# Patient Record
Sex: FEMALE | Race: WHITE | NOT HISPANIC OR LATINO
[De-identification: names, ages, dates, MRNs, and addresses within clinical notes are randomized per-mention and may not be internally consistent; named-entity substitution may affect disease eponyms.]

---

## 2021-10-11 ENCOUNTER — RESULT REVIEW (OUTPATIENT)
Age: 76
End: 2021-10-11

## 2021-10-11 ENCOUNTER — OUTPATIENT (OUTPATIENT)
Dept: OUTPATIENT SERVICES | Facility: HOSPITAL | Age: 76
LOS: 1 days | Discharge: HOME | End: 2021-10-11

## 2021-10-11 DIAGNOSIS — D64.9 ANEMIA, UNSPECIFIED: ICD-10-CM

## 2021-10-12 ENCOUNTER — RESULT REVIEW (OUTPATIENT)
Age: 76
End: 2021-10-12

## 2021-10-12 ENCOUNTER — APPOINTMENT (OUTPATIENT)
Dept: HEMATOLOGY ONCOLOGY | Facility: CLINIC | Age: 76
End: 2021-10-12
Payer: MEDICARE

## 2021-10-12 ENCOUNTER — LABORATORY RESULT (OUTPATIENT)
Age: 76
End: 2021-10-12

## 2021-10-12 VITALS
HEIGHT: 58.66 IN | DIASTOLIC BLOOD PRESSURE: 89 MMHG | RESPIRATION RATE: 12 BRPM | TEMPERATURE: 97.9 F | SYSTOLIC BLOOD PRESSURE: 158 MMHG | OXYGEN SATURATION: 99 % | HEART RATE: 92 BPM | WEIGHT: 149.69 LBS | BODY MASS INDEX: 30.58 KG/M2

## 2021-10-12 DIAGNOSIS — I10 ESSENTIAL (PRIMARY) HYPERTENSION: ICD-10-CM

## 2021-10-12 DIAGNOSIS — K21.9 GASTRO-ESOPHAGEAL REFLUX DISEASE W/OUT ESOPHAGITIS: ICD-10-CM

## 2021-10-12 DIAGNOSIS — Z78.9 OTHER SPECIFIED HEALTH STATUS: ICD-10-CM

## 2021-10-12 DIAGNOSIS — Z80.3 FAMILY HISTORY OF MALIGNANT NEOPLASM OF BREAST: ICD-10-CM

## 2021-10-12 DIAGNOSIS — K58.9 IRRITABLE BOWEL SYNDROME W/OUT DIARRHEA: ICD-10-CM

## 2021-10-12 LAB
BASOPHILS # BLD AUTO: 0 K/UL — SIGNIFICANT CHANGE UP (ref 0–0.2)
BASOPHILS NFR BLD AUTO: 0 % — SIGNIFICANT CHANGE UP (ref 0–2)
EOSINOPHIL # BLD AUTO: 0.04 K/UL — SIGNIFICANT CHANGE UP (ref 0–0.5)
EOSINOPHIL NFR BLD AUTO: 1 % — SIGNIFICANT CHANGE UP (ref 0–6)
HCT VFR BLD CALC: 39.7 % — SIGNIFICANT CHANGE UP (ref 34.5–45)
HGB BLD-MCNC: 13.1 G/DL — SIGNIFICANT CHANGE UP (ref 11.5–15.5)
LYMPHOCYTES # BLD AUTO: 3.21 K/UL — SIGNIFICANT CHANGE UP (ref 1–3.3)
LYMPHOCYTES # BLD AUTO: 77 % — HIGH (ref 13–44)
MCHC RBC-ENTMCNC: 31.1 PG — SIGNIFICANT CHANGE UP (ref 27–34)
MCHC RBC-ENTMCNC: 33 G/DL — SIGNIFICANT CHANGE UP (ref 32–36)
MCV RBC AUTO: 94.3 FL — SIGNIFICANT CHANGE UP (ref 80–100)
MONOCYTES # BLD AUTO: 0.29 K/UL — SIGNIFICANT CHANGE UP (ref 0–0.9)
MONOCYTES NFR BLD AUTO: 7 % — SIGNIFICANT CHANGE UP (ref 2–14)
NEUTROPHILS # BLD AUTO: 0.63 K/UL — LOW (ref 1.8–7.4)
NEUTROPHILS NFR BLD AUTO: 15 % — LOW (ref 43–77)
NRBC # BLD: 0 /100 — SIGNIFICANT CHANGE UP (ref 0–0)
NRBC # BLD: SIGNIFICANT CHANGE UP /100 WBCS (ref 0–0)
PLAT MORPH BLD: NORMAL — SIGNIFICANT CHANGE UP
PLATELET # BLD AUTO: 331 K/UL — SIGNIFICANT CHANGE UP (ref 150–400)
RBC # BLD: 4.21 M/UL — SIGNIFICANT CHANGE UP (ref 3.8–5.2)
RBC # FLD: 14.6 % — HIGH (ref 10.3–14.5)
RBC BLD AUTO: SIGNIFICANT CHANGE UP
WBC # BLD: 4.17 K/UL — SIGNIFICANT CHANGE UP (ref 3.8–10.5)
WBC # FLD AUTO: 4.17 K/UL — SIGNIFICANT CHANGE UP (ref 3.8–10.5)

## 2021-10-12 PROCEDURE — 99205 OFFICE O/P NEW HI 60 MIN: CPT

## 2021-10-13 LAB — HEMATOPATHOLOGY REPORT: SIGNIFICANT CHANGE UP

## 2021-10-15 LAB
ALBUMIN SERPL ELPH-MCNC: 4.4 G/DL
ALP BLD-CCNC: 91 U/L
ALT SERPL-CCNC: 16 U/L
ANION GAP SERPL CALC-SCNC: 14 MMOL/L
AST SERPL-CCNC: 15 U/L
BILIRUB SERPL-MCNC: 0.2 MG/DL
BUN SERPL-MCNC: 9 MG/DL
CALCIUM SERPL-MCNC: 10.1 MG/DL
CHLORIDE SERPL-SCNC: 103 MMOL/L
CMV DNA SPEC QL NAA+PROBE: NOT DETECTED IU/ML
CO2 SERPL-SCNC: 25 MMOL/L
CREAT SERPL-MCNC: 0.61 MG/DL
DSDNA AB SER-ACNC: <12 IU/ML
EBV DNA SERPL NAA+PROBE-ACNC: NOT DETECTED IU/ML
EBV EA AB SER IA-ACNC: >150 U/ML
EBV EA AB TITR SER IF: POSITIVE
EBV EA IGG SER QL IA: 56.2 U/ML
EBV EA IGG SER-ACNC: POSITIVE
EBV EA IGM SER IA-ACNC: POSITIVE
EBV PATRN SPEC IB-IMP: NORMAL
EBV VCA IGG SER IA-ACNC: >750 U/ML
EBV VCA IGM SER QL IA: 67.6 U/ML
EPSTEIN-BARR VIRUS CAPSID ANTIGEN IGG: POSITIVE
GLUCOSE SERPL-MCNC: 116 MG/DL
HBV CORE IGG+IGM SER QL: NONREACTIVE
HBV SURFACE AB SER QL: NONREACTIVE
HBV SURFACE AG SER QL: NONREACTIVE
HCV AB SER QL: NONREACTIVE
HCV S/CO RATIO: 0.76 S/CO
HIV1+2 AB SPEC QL IA.RAPID: NONREACTIVE
LDH SERPL-CCNC: 192 U/L
POTASSIUM SERPL-SCNC: 4.8 MMOL/L
PROT SERPL-MCNC: 7.5 G/DL
RHEUMATOID FACT SER QL: 354 IU/ML
SODIUM SERPL-SCNC: 142 MMOL/L

## 2021-10-16 PROBLEM — K58.9 IRRITABLE BOWEL SYNDROME, UNSPECIFIED TYPE: Status: ACTIVE | Noted: 2021-10-16

## 2021-10-16 PROBLEM — K21.9 CHRONIC GERD: Status: ACTIVE | Noted: 2021-10-16

## 2021-10-16 RX ORDER — MULTIVIT-MIN/FA/LYCOPEN/LUTEIN .4-300-25
TABLET ORAL DAILY
Refills: 0 | Status: ACTIVE | COMMUNITY
Start: 2021-10-16

## 2021-10-16 RX ORDER — PSYLLIUM HUSK 0.4 G
25 MCG CAPSULE ORAL DAILY
Refills: 0 | Status: ACTIVE | COMMUNITY
Start: 2021-10-16

## 2021-10-16 RX ORDER — SERTRALINE 25 MG/1
25 TABLET, FILM COATED ORAL
Refills: 0 | Status: ACTIVE | COMMUNITY
Start: 2021-10-16

## 2021-10-16 RX ORDER — LEVOTHYROXINE SODIUM 0.03 MG/1
25 TABLET ORAL DAILY
Refills: 0 | Status: ACTIVE | COMMUNITY
Start: 2021-10-16

## 2021-10-16 RX ORDER — OMEPRAZOLE 20 MG/1
20 CAPSULE, DELAYED RELEASE ORAL
Refills: 0 | Status: ACTIVE | COMMUNITY
Start: 2021-10-16

## 2021-10-16 RX ORDER — FERROUS SULFATE 325(65) MG
325 (65 FE) TABLET ORAL DAILY
Refills: 0 | Status: ACTIVE | COMMUNITY
Start: 2021-10-16

## 2021-10-16 NOTE — REVIEW OF SYSTEMS
[Fatigue] : fatigue [Insomnia] : insomnia [Anxiety] : anxiety [Depression] : depression [Chills] : chills [Night Sweats] : night sweats [Patient Intake Form Reviewed] : Patient intake form was reviewed [Fever] : no fever [Recent Change In Weight] : ~T no recent weight change [Joint Pain] : no joint pain [Joint Stiffness] : no joint stiffness [Easy Bleeding] : no tendency for easy bleeding [Easy Bruising] : no tendency for easy bruising [Swollen Glands] : no swollen glands [Negative] : Allergic/Immunologic [FreeTextEntry7] : irritable bowel [FreeTextEntry9] : past h/o fibromyalgia [de-identified] : "low grade depression" [de-identified] : as above

## 2021-10-16 NOTE — HISTORY OF PRESENT ILLNESS
[Disease:__________________________] : Disease: [unfilled] [de-identified] : Ms. 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  She has a long h/o of having a relative lymphocytosis w/o leukocytosis, and gradually more pronounced neutropenia.  ANC was 1103 11/2018 and 582 2/200, 729 1/2021 and bet. 453-800 bet. 3/21 and 8/21   She has had intermittent monocytopenia. She had EBV serologies in \par 1/2021 c/w recent infection.  She has (+) Rheumatoid Factor (261). Hgb has been in the 12 range, plt have been nl.  WBC has been in the 3-4 range.  Her last ANC was 0.8 on 9/28/21.\par BMA/Bx from 3/18/21 has been reviewed here: CD5+ monoclonal B cells - minimal involvement-in marrow with trilineage maturation.  Mild increase in NK like T cells were seen; no significant increase in CD57+ cells was seen on review here; outside report notes increase (11.3%) in CD57/CD3+ T-LGL cells, NK cells were 2.3%.  Cytogenetics was normal.  Clonal TCR gamma gene rearrangement was noted. \par She has not had fever, dental, skin or other infections.  She has not had rash or significant rheumatological complaints.\par She had EBV serologies showing reactivation and was told that she had EBV mononucleosis earlier in the year. At the time she had chills, temp < 10.  She c/o fatigue and occasional night sweats.\par \par She has recovered from right facial/ocular shingles. [de-identified] : initial visit.

## 2021-10-16 NOTE — ASSESSMENT
[FreeTextEntry1] : 77 yo woman with persistent neutropenia, monoclonal B-cell CD5 + population and bone marrow and questionable increase in CD3 + CD57 + clonal T-cell population in marrow.  TCR gamma is reported to be a rearranged.  If not completely clear whether or not she has a clonal T-cell LGL population but the clinical findings including presence of neutropenia and the positive RA serology are consistent with that syndrome.  She has not had an ANC under 500 and has not had recurrent infections.  She has not had rheumatologic complaints.\par Based on the bone marrow findings, the neutropenia is not due to extensive infiltration with a lymphoproliferative disorder, whether CD5 + B cells or T cells.  I do not have the imaging to review now.  It would be helpful to exclude presence of splenomegaly in this regard, although she does not have an enlarged spleen on exam.\par She will require therapeutic intervention only of the  500 and especially if she begins to have associated infections.  That setting, empiric treatment with weekly methotrexate would have to be considered. neutropenia is not related to expansion of T-LGL cells but is antibody mediated, which cannot be further evaluated since antineutrophil antibody measurements are highly unreliable.\par PB flow will be done today along with repeat TCR alpha and beta rearrangement.  CMP, LDH will be obtained, also viral serologies, incl. EBV (repeat-recent +) and EBV quant DNA PCR, repeat rheumatoid factor, anti dsDNA and immunoelectrophoresis.\par If a B-CLL population is identified in PB, it would be under the level needed to diagnose CLL so this would be characterized as MBL.\par She will call for results in a few days. [Palliative Care Plan] : not applicable at this time

## 2021-10-16 NOTE — CONSULT LETTER
[Dear  ___] : Dear  [unfilled], [Consult Letter:] : I had the pleasure of evaluating your patient, [unfilled]. [Please see my note below.] : Please see my note below. [Consult Closing:] : Thank you very much for allowing me to participate in the care of this patient.  If you have any questions, please do not hesitate to contact me. [Sincerely,] : Sincerely, [FreeTextEntry2] : Lisa Antonio M.D. [FreeTextEntry3] : Fei Mendoza M.D., [facility]\par

## 2021-11-11 ENCOUNTER — APPOINTMENT (OUTPATIENT)
Dept: ULTRASOUND IMAGING | Facility: CLINIC | Age: 76
End: 2021-11-11

## 2021-12-03 ENCOUNTER — OUTPATIENT (OUTPATIENT)
Dept: OUTPATIENT SERVICES | Facility: HOSPITAL | Age: 76
LOS: 1 days | End: 2021-12-03
Payer: MEDICARE

## 2021-12-03 ENCOUNTER — APPOINTMENT (OUTPATIENT)
Dept: ULTRASOUND IMAGING | Facility: CLINIC | Age: 76
End: 2021-12-03
Payer: MEDICARE

## 2021-12-03 DIAGNOSIS — D72.820 LYMPHOCYTOSIS (SYMPTOMATIC): ICD-10-CM

## 2021-12-03 PROCEDURE — 76700 US EXAM ABDOM COMPLETE: CPT

## 2021-12-03 PROCEDURE — 76700 US EXAM ABDOM COMPLETE: CPT | Mod: 26

## 2022-03-20 ENCOUNTER — OUTPATIENT (OUTPATIENT)
Dept: OUTPATIENT SERVICES | Facility: HOSPITAL | Age: 77
LOS: 1 days | Discharge: HOME | End: 2022-03-20

## 2022-03-20 DIAGNOSIS — D64.9 ANEMIA, UNSPECIFIED: ICD-10-CM

## 2022-03-22 ENCOUNTER — RESULT REVIEW (OUTPATIENT)
Age: 77
End: 2022-03-22

## 2022-03-22 ENCOUNTER — LABORATORY RESULT (OUTPATIENT)
Age: 77
End: 2022-03-22

## 2022-03-22 ENCOUNTER — APPOINTMENT (OUTPATIENT)
Dept: HEMATOLOGY ONCOLOGY | Facility: CLINIC | Age: 77
End: 2022-03-22
Payer: MEDICARE

## 2022-03-22 ENCOUNTER — APPOINTMENT (OUTPATIENT)
Dept: HEMATOLOGY ONCOLOGY | Facility: CLINIC | Age: 77
End: 2022-03-22

## 2022-03-22 VITALS
OXYGEN SATURATION: 98 % | HEIGHT: 59.49 IN | SYSTOLIC BLOOD PRESSURE: 160 MMHG | WEIGHT: 135.78 LBS | DIASTOLIC BLOOD PRESSURE: 99 MMHG | BODY MASS INDEX: 27.01 KG/M2 | TEMPERATURE: 97.9 F | HEART RATE: 97 BPM | RESPIRATION RATE: 14 BRPM

## 2022-03-22 DIAGNOSIS — F32.A DEPRESSION, UNSPECIFIED: ICD-10-CM

## 2022-03-22 LAB
BASOPHILS # BLD AUTO: 0 K/UL — SIGNIFICANT CHANGE UP (ref 0–0.2)
BASOPHILS NFR BLD AUTO: 0 % — SIGNIFICANT CHANGE UP (ref 0–2)
EOSINOPHIL # BLD AUTO: 0 K/UL — SIGNIFICANT CHANGE UP (ref 0–0.5)
EOSINOPHIL NFR BLD AUTO: 0 % — SIGNIFICANT CHANGE UP (ref 0–6)
ERYTHROCYTE [SEDIMENTATION RATE] IN BLOOD: 19 MM/HR — SIGNIFICANT CHANGE UP (ref 0–20)
HCT VFR BLD CALC: 41.9 % — SIGNIFICANT CHANGE UP (ref 34.5–45)
HGB BLD-MCNC: 13.7 G/DL — SIGNIFICANT CHANGE UP (ref 11.5–15.5)
LYMPHOCYTES # BLD AUTO: 2.74 K/UL — SIGNIFICANT CHANGE UP (ref 1–3.3)
LYMPHOCYTES # BLD AUTO: 79 % — HIGH (ref 13–44)
MCHC RBC-ENTMCNC: 31.8 PG — SIGNIFICANT CHANGE UP (ref 27–34)
MCHC RBC-ENTMCNC: 32.7 G/DL — SIGNIFICANT CHANGE UP (ref 32–36)
MCV RBC AUTO: 97.2 FL — SIGNIFICANT CHANGE UP (ref 80–100)
MONOCYTES # BLD AUTO: 0.24 K/UL — SIGNIFICANT CHANGE UP (ref 0–0.9)
MONOCYTES NFR BLD AUTO: 7 % — SIGNIFICANT CHANGE UP (ref 2–14)
NEUTROPHILS # BLD AUTO: 0.49 K/UL — LOW (ref 1.8–7.4)
NEUTROPHILS NFR BLD AUTO: 14 % — LOW (ref 43–77)
NRBC # BLD: 0 /100 — SIGNIFICANT CHANGE UP (ref 0–0)
NRBC # BLD: SIGNIFICANT CHANGE UP /100 WBCS (ref 0–0)
PLAT MORPH BLD: NORMAL — SIGNIFICANT CHANGE UP
PLATELET # BLD AUTO: 223 K/UL — SIGNIFICANT CHANGE UP (ref 150–400)
RBC # BLD: 4.31 M/UL — SIGNIFICANT CHANGE UP (ref 3.8–5.2)
RBC # FLD: 14 % — SIGNIFICANT CHANGE UP (ref 10.3–14.5)
RBC BLD AUTO: SIGNIFICANT CHANGE UP
WBC # BLD: 3.47 K/UL — LOW (ref 3.8–10.5)
WBC # FLD AUTO: 3.47 K/UL — LOW (ref 3.8–10.5)

## 2022-03-22 PROCEDURE — 99214 OFFICE O/P EST MOD 30 MIN: CPT

## 2022-03-24 NOTE — REVIEW OF SYSTEMS
[Patient Intake Form Reviewed] : Patient intake form was reviewed [Fatigue] : fatigue [Insomnia] : insomnia [Anxiety] : anxiety [Depression] : depression [Negative] : Allergic/Immunologic [Fever] : no fever [Chills] : no chills [Night Sweats] : no night sweats [Recent Change In Weight] : ~T no recent weight change [Joint Pain] : no joint pain [Joint Stiffness] : no joint stiffness [Easy Bleeding] : no tendency for easy bleeding [Easy Bruising] : no tendency for easy bruising [Swollen Glands] : no swollen glands [FreeTextEntry7] : irritable bowel [FreeTextEntry9] : past h/o fibromyalgia [de-identified] : "low grade depression" [de-identified] : as above

## 2022-03-24 NOTE — PHYSICAL EXAM
[Normal] : affect appropriate [Fully active, able to carry on all pre-disease performance without restriction] : Status 0 - Fully active, able to carry on all pre-disease performance without restriction [de-identified] : no CVAT

## 2022-03-24 NOTE — HISTORY OF PRESENT ILLNESS
[Disease:__________________________] : Disease: [unfilled] [de-identified] : Ms. 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  She has a long h/o of having a relative lymphocytosis w/o leukocytosis, and gradually more pronounced neutropenia.  ANC was 1103 11/2018 and 582 2/200, 729 1/2021 and bet. 453-800 bet. 3/21 and 8/21   She has had intermittent monocytopenia. She had EBV serologies in \par 1/2021 c/w recent infection.  She has (+) Rheumatoid Factor (261). Hgb has been in the 12 range, plt have been nl.  WBC has been in the 3-4 range.  Her last ANC was 0.8 on 9/28/21.\par BMA/Bx from 3/18/21 has been reviewed here: CD5+ monoclonal B cells - minimal involvement-in marrow with trilineage maturation.  Mild increase in NK like T cells were seen; no significant increase in CD57+ cells was seen on review here; outside report notes increase (11.3%) in CD57/CD3+ T-LGL cells, NK cells were 2.3%.  Cytogenetics was normal.  Clonal TCR gamma gene rearrangement was noted. \par She has not had fever, dental, skin or other infections.  She has not had rash or significant rheumatological complaints.\par She had EBV serologies showing reactivation and was told that she had EBV mononucleosis earlier in the year. At the time she had chills, temp < 10.  She c/o fatigue and occasional night sweats.\par \par She has recovered from right facial/ocular shingles. [de-identified] : Stable last few months\par no fevers\par PB showed < 5000 CD5+ B cells\par incr in partial CD56+, CD57+ NK cells, TCR beta rearr+, TCR gamma rearr  indeterminate\par less c/o chills, sweats\par chronic arthralgias, myalgias\par

## 2022-03-24 NOTE — ASSESSMENT
[Palliative Care Plan] : not applicable at this time [FreeTextEntry1] : 77 yo woman with persistent neutropenia, monoclonal B-cell CD5 + population and bone marrow and questionable increase in CD3 + CD57 + clonal T-cell population in marrow.  \par CBC results reviewed and d/w pt\par WBC 3.47, Hgb 13.7, Plt 223K, 14% PMN, ANC 0.49\par ANC today just under 500 (0.49)\par Monoclonal CD5+ pop < 5000 >>> Does not meet criteria for B-CLL; has MBL \par She will require therapeutic intervention only if the ANC persistently < 500 and especially if she begins to have associated infections.  Empiric treatment with weekly methotrexate would have to be considered as therapy\par for T/LGL mediated neutropenia. \par MBL appears quiescent\par Rec CBC with Dr. Antonio in 6 wks\par f/u with her or here in 3 mos\par  \par  .

## 2022-03-24 NOTE — CONSULT LETTER
[Dear  ___] : Dear  [unfilled], [Please see my note below.] : Please see my note below. [Consult Closing:] : Thank you very much for allowing me to participate in the care of this patient.  If you have any questions, please do not hesitate to contact me. [Sincerely,] : Sincerely, [Courtesy Letter:] : I had the pleasure of seeing your patient, [unfilled], in my office today. [FreeTextEntry2] : Lisa Antonio M.D. [FreeTextEntry3] : Fei Mendoza M.D., [facility]\par

## 2022-03-27 LAB
ALBUMIN MFR SERPL ELPH: 51.1 %
ALBUMIN SERPL ELPH-MCNC: 4.7 G/DL
ALBUMIN SERPL-MCNC: 3.8 G/DL
ALBUMIN/GLOB SERPL: 1 RATIO
ALP BLD-CCNC: 88 U/L
ALPHA1 GLOB MFR SERPL ELPH: 5.8 %
ALPHA1 GLOB SERPL ELPH-MCNC: 0.4 G/DL
ALPHA2 GLOB MFR SERPL ELPH: 14.7 %
ALPHA2 GLOB SERPL ELPH-MCNC: 1.1 G/DL
ALT SERPL-CCNC: 20 U/L
ANION GAP SERPL CALC-SCNC: 15 MMOL/L
AST SERPL-CCNC: 18 U/L
B-GLOBULIN MFR SERPL ELPH: 12.7 %
B-GLOBULIN SERPL ELPH-MCNC: 1 G/DL
BILIRUB SERPL-MCNC: 0.3 MG/DL
BUN SERPL-MCNC: 11 MG/DL
CALCIUM SERPL-MCNC: 10.3 MG/DL
CHLORIDE SERPL-SCNC: 104 MMOL/L
CO2 SERPL-SCNC: 25 MMOL/L
COVID-19 SPIKE DOMAIN ANTIBODY INTERPRETATION: POSITIVE
CREAT SERPL-MCNC: 0.74 MG/DL
DEPRECATED KAPPA LC FREE/LAMBDA SER: 1.02 RATIO
DEPRECATED KAPPA LC FREE/LAMBDA SER: 1.3 RATIO
EBV DNA SERPL NAA+PROBE-ACNC: NOT DETECTED IU/ML
EBV EA AB SER IA-ACNC: >150 U/ML
EBV EA AB TITR SER IF: POSITIVE
EBV EA IGG SER QL IA: 48.7 U/ML
EBV EA IGG SER-ACNC: POSITIVE
EBV EA IGM SER IA-ACNC: POSITIVE
EBV PATRN SPEC IB-IMP: NORMAL
EBV VCA IGG SER IA-ACNC: 717 U/ML
EBV VCA IGM SER QL IA: 58 U/ML
EGFR: 84 ML/MIN/1.73M2
EPSTEIN-BARR VIRUS CAPSID ANTIGEN IGG: POSITIVE
GAMMA GLOB FLD ELPH-MCNC: 1.2 G/DL
GAMMA GLOB MFR SERPL ELPH: 15.7 %
GLUCOSE SERPL-MCNC: 123 MG/DL
IGA SER QL IEP: 155 MG/DL
IGA SER QL IEP: 168 MG/DL
IGG SER QL IEP: 1186 MG/DL
IGG SER QL IEP: 1231 MG/DL
IGM SER QL IEP: 321 MG/DL
IGM SER QL IEP: 329 MG/DL
INTERPRETATION SERPL IEP-IMP: NORMAL
KAPPA LC CSF-MCNC: 1.95 MG/DL
KAPPA LC CSF-MCNC: 1.97 MG/DL
KAPPA LC SERPL-MCNC: 1.98 MG/DL
KAPPA LC SERPL-MCNC: 2.57 MG/DL
LDH SERPL-CCNC: 165 U/L
M PROTEIN SPEC IFE-MCNC: NORMAL
POTASSIUM SERPL-SCNC: 4.6 MMOL/L
PROT SERPL-MCNC: 7.5 G/DL
PROT SERPL-MCNC: 7.5 G/DL
PROT SERPL-MCNC: 7.7 G/DL
SARS-COV-2 AB SERPL IA-ACNC: >250 U/ML
SODIUM SERPL-SCNC: 144 MMOL/L

## 2022-04-01 ENCOUNTER — APPOINTMENT (OUTPATIENT)
Dept: RHEUMATOLOGY | Facility: CLINIC | Age: 77
End: 2022-04-01
Payer: MEDICARE

## 2022-04-01 VITALS
BODY MASS INDEX: 26.46 KG/M2 | HEART RATE: 86 BPM | WEIGHT: 133 LBS | TEMPERATURE: 97.2 F | SYSTOLIC BLOOD PRESSURE: 166 MMHG | OXYGEN SATURATION: 97 % | HEIGHT: 59.49 IN | DIASTOLIC BLOOD PRESSURE: 92 MMHG

## 2022-04-01 DIAGNOSIS — R76.8 OTHER SPECIFIED ABNORMAL IMMUNOLOGICAL FINDINGS IN SERUM: ICD-10-CM

## 2022-04-01 DIAGNOSIS — Z87.39 PERSONAL HISTORY OF OTHER DISEASES OF THE MUSCULOSKELETAL SYSTEM AND CONNECTIVE TISSUE: ICD-10-CM

## 2022-04-01 PROCEDURE — 99204 OFFICE O/P NEW MOD 45 MIN: CPT

## 2022-04-01 RX ORDER — IBUPROFEN 200 MG
600 CAPSULE ORAL
Refills: 0 | Status: ACTIVE | COMMUNITY

## 2022-04-06 LAB
ALBUMIN SERPL ELPH-MCNC: 4.4 G/DL
ALP BLD-CCNC: 87 U/L
ALT SERPL-CCNC: 15 U/L
ANA SER IF-ACNC: NEGATIVE
ANCA AB SER-IMP: NEGATIVE
ANION GAP SERPL CALC-SCNC: 17 MMOL/L
AST SERPL-CCNC: 17 U/L
BILIRUB SERPL-MCNC: 0.2 MG/DL
BUN SERPL-MCNC: 10 MG/DL
C-ANCA SER-ACNC: NEGATIVE
CALCIUM SERPL-MCNC: 9.9 MG/DL
CCP AB SER IA-ACNC: <8 UNITS
CENTROMERE IGG SER-ACNC: <0.2 CD:130001892
CHLORIDE SERPL-SCNC: 104 MMOL/L
CK SERPL-CCNC: 47 U/L
CO2 SERPL-SCNC: 22 MMOL/L
CREAT SERPL-MCNC: 0.6 MG/DL
CRP SERPL-MCNC: 15 MG/L
EGFR: 93 ML/MIN/1.73M2
ENA RNP AB SER IA-ACNC: <0.2 AL
ENA SCL70 IGG SER IA-ACNC: <0.2 AL
ENA SM AB SER IA-ACNC: <0.2 AL
ENA SS-A AB SER IA-ACNC: <0.2 AL
ENA SS-B AB SER IA-ACNC: <0.2 AL
GLUCOSE SERPL-MCNC: 102 MG/DL
P-ANCA TITR SER IF: NEGATIVE
POTASSIUM SERPL-SCNC: 4.6 MMOL/L
PROT SERPL-MCNC: 7.4 G/DL
RF+CCP IGG SER-IMP: NEGATIVE
SODIUM SERPL-SCNC: 144 MMOL/L

## 2022-04-07 LAB
ALBUMIN MFR SERPL ELPH: 54.7 %
ALBUMIN SERPL-MCNC: 4 G/DL
ALBUMIN/GLOB SERPL: 1.2 RATIO
ALPHA1 GLOB MFR SERPL ELPH: 4.9 %
ALPHA1 GLOB SERPL ELPH-MCNC: 0.4 G/DL
ALPHA2 GLOB MFR SERPL ELPH: 13.4 %
ALPHA2 GLOB SERPL ELPH-MCNC: 1 G/DL
B-GLOBULIN MFR SERPL ELPH: 11.4 %
B-GLOBULIN SERPL ELPH-MCNC: 0.8 G/DL
DEPRECATED KAPPA LC FREE/LAMBDA SER: 1.09 RATIO
GAMMA GLOB FLD ELPH-MCNC: 1.2 G/DL
GAMMA GLOB MFR SERPL ELPH: 15.6 %
IGA SER QL IEP: 176 MG/DL
IGG SER QL IEP: 1185 MG/DL
IGM SER QL IEP: 324 MG/DL
INTERPRETATION SERPL IEP-IMP: NORMAL
KAPPA LC CSF-MCNC: 2 MG/DL
KAPPA LC SERPL-MCNC: 2.18 MG/DL
M PROTEIN SPEC IFE-MCNC: NORMAL
PROT SERPL-MCNC: 7.4 G/DL
PROT SERPL-MCNC: 7.4 G/DL

## 2022-04-25 ENCOUNTER — NON-APPOINTMENT (OUTPATIENT)
Age: 77
End: 2022-04-25

## 2022-04-25 LAB
EJ AB SER QL: NEGATIVE
ENA JO1 AB SER IA-ACNC: <20 UNITS
ENA PM/SCL AB SER-ACNC: <20 UNITS
ENA SM+RNP AB SER IA-ACNC: <20 UNITS
ENA SS-A IGG SER QL: <20 UNITS
FIBRILLARIN AB SER QL: NEGATIVE
KU AB SER QL: NEGATIVE
MDA-5 (P140)(CADM-140): <20 UNITS
MI2 AB SER QL: NEGATIVE
NXP-2 (P140): <20 UNITS
OJ AB SER QL: NEGATIVE
PL12 AB SER QL: NEGATIVE
PL7 AB SER QL: NEGATIVE
SRP AB SERPL QL: NEGATIVE
TIF GAMMA (P155/140): <20 UNITS
U2 SNRNP AB SER QL: NEGATIVE

## 2022-06-21 ENCOUNTER — APPOINTMENT (OUTPATIENT)
Dept: HEMATOLOGY ONCOLOGY | Facility: CLINIC | Age: 77
End: 2022-06-21

## 2024-01-01 ENCOUNTER — OUTPATIENT (OUTPATIENT)
Dept: OUTPATIENT SERVICES | Facility: HOSPITAL | Age: 79
LOS: 1 days | Discharge: HOME | End: 2024-01-01

## 2024-01-01 DIAGNOSIS — D64.9 ANEMIA, UNSPECIFIED: ICD-10-CM

## 2024-01-01 LAB
ANISOCYTOSIS BLD QL: SLIGHT — SIGNIFICANT CHANGE UP
BASOPHILS # BLD AUTO: 0 K/UL — SIGNIFICANT CHANGE UP (ref 0–0.2)
BASOPHILS # BLD AUTO: 0.01 K/UL — SIGNIFICANT CHANGE UP (ref 0–0.2)
BASOPHILS NFR BLD AUTO: 0 % — SIGNIFICANT CHANGE UP (ref 0–2)
BASOPHILS NFR BLD AUTO: 0.3 % — SIGNIFICANT CHANGE UP (ref 0–2)
BLASTS # FLD: 2 % — HIGH (ref 0–0)
DACRYOCYTES BLD QL SMEAR: SLIGHT — SIGNIFICANT CHANGE UP
EOSINOPHIL # BLD AUTO: 0 K/UL — SIGNIFICANT CHANGE UP (ref 0–0.5)
EOSINOPHIL # BLD AUTO: 0.02 K/UL — SIGNIFICANT CHANGE UP (ref 0–0.5)
EOSINOPHIL NFR BLD AUTO: 0 % — SIGNIFICANT CHANGE UP (ref 0–6)
EOSINOPHIL NFR BLD AUTO: 2 % — SIGNIFICANT CHANGE UP (ref 0–6)
HCT VFR BLD CALC: 32.2 % — LOW (ref 34.5–45)
HCT VFR BLD CALC: 35.5 % — SIGNIFICANT CHANGE UP (ref 34.5–45)
HGB BLD-MCNC: 10.3 G/DL — LOW (ref 11.5–15.5)
HGB BLD-MCNC: 10.8 G/DL — LOW (ref 11.5–15.5)
IMM GRANULOCYTES NFR BLD AUTO: 0 % — SIGNIFICANT CHANGE UP (ref 0–0.9)
LYMPHOCYTES # BLD AUTO: 0.78 K/UL — LOW (ref 1–3.3)
LYMPHOCYTES # BLD AUTO: 1.63 K/UL — SIGNIFICANT CHANGE UP (ref 1–3.3)
LYMPHOCYTES # BLD AUTO: 45.9 % — HIGH (ref 13–44)
LYMPHOCYTES # BLD AUTO: 76 % — HIGH (ref 13–44)
MCHC RBC-ENTMCNC: 26.7 PG — LOW (ref 27–34)
MCHC RBC-ENTMCNC: 28.4 PG — SIGNIFICANT CHANGE UP (ref 27–34)
MCHC RBC-ENTMCNC: 30.4 G/DL — LOW (ref 32–36)
MCHC RBC-ENTMCNC: 32 G/DL — SIGNIFICANT CHANGE UP (ref 32–36)
MCV RBC AUTO: 88 FL — SIGNIFICANT CHANGE UP (ref 80–100)
MCV RBC AUTO: 88.7 FL — SIGNIFICANT CHANGE UP (ref 80–100)
MONOCYTES # BLD AUTO: 0.08 K/UL — SIGNIFICANT CHANGE UP (ref 0–0.9)
MONOCYTES # BLD AUTO: 0.11 K/UL — SIGNIFICANT CHANGE UP (ref 0–0.9)
MONOCYTES NFR BLD AUTO: 3.1 % — SIGNIFICANT CHANGE UP (ref 2–14)
MONOCYTES NFR BLD AUTO: 8 % — SIGNIFICANT CHANGE UP (ref 2–14)
NEUTROPHILS # BLD AUTO: 0.12 K/UL — LOW (ref 1.8–7.4)
NEUTROPHILS # BLD AUTO: 1.8 K/UL — SIGNIFICANT CHANGE UP (ref 1.8–7.4)
NEUTROPHILS NFR BLD AUTO: 12 % — LOW (ref 43–77)
NEUTROPHILS NFR BLD AUTO: 50.7 % — SIGNIFICANT CHANGE UP (ref 43–77)
NRBC # BLD: 0 /100 WBCS — SIGNIFICANT CHANGE UP (ref 0–0)
NRBC # BLD: 1 /100 WBCS — HIGH (ref 0–0)
NRBC # BLD: SIGNIFICANT CHANGE UP /100 WBCS (ref 0–0)
PLAT MORPH BLD: NORMAL — SIGNIFICANT CHANGE UP
PLATELET # BLD AUTO: 143 K/UL — LOW (ref 150–400)
PLATELET # BLD AUTO: 203 K/UL — SIGNIFICANT CHANGE UP (ref 150–400)
POIKILOCYTOSIS BLD QL AUTO: SLIGHT — SIGNIFICANT CHANGE UP
RBC # BLD: 3.63 M/UL — LOW (ref 3.8–5.2)
RBC # BLD: 4.03 M/UL — SIGNIFICANT CHANGE UP (ref 3.8–5.2)
RBC # FLD: 19.8 % — HIGH (ref 10.3–14.5)
RBC # FLD: 25.6 % — HIGH (ref 10.3–14.5)
RBC BLD AUTO: ABNORMAL
TARGETS BLD QL SMEAR: SLIGHT — SIGNIFICANT CHANGE UP
WBC # BLD: 1.02 K/UL — LOW (ref 3.8–10.5)
WBC # BLD: 3.55 K/UL — LOW (ref 3.8–10.5)
WBC # FLD AUTO: 1.02 K/UL — LOW (ref 3.8–10.5)
WBC # FLD AUTO: 3.55 K/UL — LOW (ref 3.8–10.5)

## 2024-02-20 ENCOUNTER — RESULT REVIEW (OUTPATIENT)
Age: 79
End: 2024-02-20

## 2024-02-20 ENCOUNTER — APPOINTMENT (OUTPATIENT)
Dept: INFUSION THERAPY | Facility: HOSPITAL | Age: 79
End: 2024-02-20

## 2024-02-20 ENCOUNTER — OUTPATIENT (OUTPATIENT)
Dept: OUTPATIENT SERVICES | Facility: HOSPITAL | Age: 79
LOS: 1 days | Discharge: HOME | End: 2024-02-20

## 2024-02-20 ENCOUNTER — APPOINTMENT (OUTPATIENT)
Dept: HEMATOLOGY ONCOLOGY | Facility: CLINIC | Age: 79
End: 2024-02-20

## 2024-02-20 ENCOUNTER — APPOINTMENT (OUTPATIENT)
Dept: HEMATOLOGY ONCOLOGY | Facility: CLINIC | Age: 79
End: 2024-02-20
Payer: MEDICARE

## 2024-02-20 VITALS
RESPIRATION RATE: 15 BRPM | HEART RATE: 89 BPM | TEMPERATURE: 97.5 F | SYSTOLIC BLOOD PRESSURE: 143 MMHG | BODY MASS INDEX: 22.56 KG/M2 | OXYGEN SATURATION: 98 % | WEIGHT: 113.54 LBS | DIASTOLIC BLOOD PRESSURE: 84 MMHG

## 2024-02-20 DIAGNOSIS — E06.3 AUTOIMMUNE THYROIDITIS: ICD-10-CM

## 2024-02-20 DIAGNOSIS — D64.9 ANEMIA, UNSPECIFIED: ICD-10-CM

## 2024-02-20 LAB
ALBUMIN SERPL ELPH-MCNC: 3.9 G/DL
ALP BLD-CCNC: 175 U/L
ALT SERPL-CCNC: 20 U/L
ANION GAP SERPL CALC-SCNC: 12 MMOL/L
AST SERPL-CCNC: 14 U/L
BASOPHILS # BLD AUTO: 0 K/UL — SIGNIFICANT CHANGE UP (ref 0–0.2)
BASOPHILS NFR BLD AUTO: 0 % — SIGNIFICANT CHANGE UP (ref 0–2)
BILIRUB SERPL-MCNC: 0.3 MG/DL
BLASTS # FLD: 1 % — HIGH (ref 0–0)
BUN SERPL-MCNC: 15 MG/DL
CALCIUM SERPL-MCNC: 9.9 MG/DL
CHLORIDE SERPL-SCNC: 98 MMOL/L
CO2 SERPL-SCNC: 28 MMOL/L
CREAT SERPL-MCNC: 0.73 MG/DL
EGFR: 84 ML/MIN/1.73M2
EOSINOPHIL # BLD AUTO: 0.02 K/UL — SIGNIFICANT CHANGE UP (ref 0–0.5)
EOSINOPHIL NFR BLD AUTO: 1 % — SIGNIFICANT CHANGE UP (ref 0–6)
GLUCOSE SERPL-MCNC: 107 MG/DL
HCT VFR BLD CALC: 34.1 % — LOW (ref 34.5–45)
HGB BLD-MCNC: 10.5 G/DL — LOW (ref 11.5–15.5)
LDH SERPL-CCNC: 193 U/L
LYMPHOCYTES # BLD AUTO: 1.96 K/UL — SIGNIFICANT CHANGE UP (ref 1–3.3)
LYMPHOCYTES # BLD AUTO: 83 % — HIGH (ref 13–44)
MCHC RBC-ENTMCNC: 29.8 PG — SIGNIFICANT CHANGE UP (ref 27–34)
MCHC RBC-ENTMCNC: 30.8 G/DL — LOW (ref 32–36)
MCV RBC AUTO: 96.9 FL — SIGNIFICANT CHANGE UP (ref 80–100)
MONOCYTES # BLD AUTO: 0.14 K/UL — SIGNIFICANT CHANGE UP (ref 0–0.9)
MONOCYTES NFR BLD AUTO: 6 % — SIGNIFICANT CHANGE UP (ref 2–14)
NEUTROPHILS # BLD AUTO: 0.21 K/UL — LOW (ref 1.8–7.4)
NEUTROPHILS NFR BLD AUTO: 9 % — LOW (ref 43–77)
NRBC # BLD: 1 /100 WBCS — HIGH (ref 0–0)
NRBC # BLD: SIGNIFICANT CHANGE UP /100 WBCS (ref 0–0)
PLAT MORPH BLD: NORMAL — SIGNIFICANT CHANGE UP
PLATELET # BLD AUTO: 131 K/UL — LOW (ref 150–400)
POTASSIUM SERPL-SCNC: 4.6 MMOL/L
PROT SERPL-MCNC: 7.5 G/DL
RBC # BLD: 3.52 M/UL — LOW (ref 3.8–5.2)
RBC # FLD: 16.9 % — HIGH (ref 10.3–14.5)
RBC BLD AUTO: SIGNIFICANT CHANGE UP
SODIUM SERPL-SCNC: 138 MMOL/L
WBC # BLD: 2.36 K/UL — LOW (ref 3.8–10.5)
WBC # FLD AUTO: 2.36 K/UL — LOW (ref 3.8–10.5)

## 2024-02-20 PROCEDURE — 99214 OFFICE O/P EST MOD 30 MIN: CPT

## 2024-02-20 PROCEDURE — G2211 COMPLEX E/M VISIT ADD ON: CPT

## 2024-02-20 RX ORDER — DIAZEPAM 5 MG/1
5 TABLET ORAL
Refills: 0 | Status: ACTIVE | COMMUNITY
Start: 2021-10-16

## 2024-02-20 RX ORDER — BUPROPION HYDROCHLORIDE 150 MG/1
150 TABLET, FILM COATED ORAL
Refills: 0 | Status: ACTIVE | COMMUNITY
Start: 2024-02-20

## 2024-02-21 DIAGNOSIS — D70.9 NEUTROPENIA, UNSPECIFIED: ICD-10-CM

## 2024-02-21 DIAGNOSIS — Z51.89 ENCOUNTER FOR OTHER SPECIFIED AFTERCARE: ICD-10-CM

## 2024-02-21 PROBLEM — E06.3 HASHIMOTO'S DISEASE: Status: ACTIVE | Noted: 2021-10-12

## 2024-02-23 LAB
EBV DNA SERPL NAA+PROBE-ACNC: NOT DETECTED IU/ML
EBVPCR LOG: NOT DETECTED LOG10IU/ML

## 2024-02-23 NOTE — ASSESSMENT
[Palliative Care Plan] : not applicable at this time [FreeTextEntry1] : 77 yo woman with persistent neutropenia, monoclonal B-cell CD5 + population and bone marrow and questionable increase in CD3 + CD57 + clonal T-cell population in marrow.   Monoclonal CD5+ pop < 5000 >>> Does not meet criteria for B-CLL; has MBL  She will require therapeutic intervention only if the ANC persistently < 500 and especially if she begins to have associated infections.  Empiric treatment with weekly methotrexate would have to be considered as therapy for T/LGL mediated neutropenia.   #Cytopenias: Patient is leukopenic, anemic and thrombocytopenic  Diff notable for 9% neutrophils ANC of 0.21, very neutropenic Labs: WBC 2.36, Hgb 10.5, MCV 96.9, RDW 15.9, plt 131 CMP is notable for elevated alk phos 175.   LDH is 193 WNL EBV undetected by PCR   Scanned bone marrow biopsy report from 11/7/23 states that sample is small and overall insufficient for adequate characterization. Small and fragmented marrow with CD5+ B cell lymphoproliferative disorder appreciated by flow cytometry. Background of trilineage hematopoiesis. No increase in blasts in this limited sample. Dysplasia cannot be adequately assessed.   Flow detected population of kappa restricted B cells expressing CD5 and . Diagnostic considerations include but not limited to CLL,/SLL, CLL-like MBL. MZL can rarely express CD5.   - Patient has known history of MBL. Previous BMBx from 10/2021 found increase in NK-like T cells and clonal TCR-gamma gene rearrangement, which suggests a possible T/NK LGL. Neutropenia, anemia and thrombocytopenia can occur with LGL. LGL also typically features splenic and hepatic involvement. Aplastic anemia could have a similar presentation but wouldn't feature TCR gene rearrangement.  - Will plan for BMBx and aspirate and send for flow cytometry. Assess for T cell LGL with CD3+ CD57+ . Review smear. Of note, T-cell LGL is not curable. Due to risk of recurrent infections from severe neutropenia, goal of therapy would be to relieve symptoms, improve cytopenias and reduce infections. Given ANC < 500 persistently with poor response G-CSF, if T-cell LGL is confirmed, we would start immunosuppressive therapy with either methotrexate, cyclophosphamide or cyclosporine as single agents or in combination with prednisone.  We would prefer oral methotrexate 10mg/m2 weekly. We would also start prednisone 1mg/kg per day, and taper by the end of the second month.  - Patient to receive zarxio 300mcg today due to severe neutropenia  RTC this week seen and D/w Dr. Reji Adkins MD PGY6

## 2024-02-23 NOTE — END OF VISIT
[] : Fellow [FreeTextEntry3] : will need to reclarify marrow status before making further recommendations. Will also obtain reports of imaging pt says was obtained at [facility] facilities over last few months.

## 2024-02-23 NOTE — PHYSICAL EXAM
[Fully active, able to carry on all pre-disease performance without restriction] : Status 0 - Fully active, able to carry on all pre-disease performance without restriction [Normal] : affect appropriate [de-identified] : no CVAT

## 2024-02-23 NOTE — HISTORY OF PRESENT ILLNESS
[Disease:__________________________] : Disease: [unfilled] [de-identified] : Ms. 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  She has a long h/o of having a relative lymphocytosis w/o leukocytosis, and gradually more pronounced neutropenia.  ANC was 1103 11/2018 and 582 2/200, 729 1/2021 and bet. 453-800 bet. 3/21 and 8/21   She has had intermittent monocytopenia. She had EBV serologies in  1/2021 c/w recent infection.  She has (+) Rheumatoid Factor (261). Hgb has been in the 12 range, plt have been nl.  WBC has been in the 3-4 range.  Her last ANC was 0.8 on 9/28/21. BMA/Bx from 3/18/21 has been reviewed here: CD5+ monoclonal B cells - minimal involvement-in marrow with trilineage maturation.  Mild increase in NK like T cells were seen; no significant increase in CD57+ cells was seen on review here; outside report notes increase (11.3%) in CD57/CD3+ T-LGL cells, NK cells were 2.3%.  Cytogenetics was normal.  Clonal TCR gamma gene rearrangement was noted.  She has not had fever, dental, skin or other infections.  She has not had rash or significant rheumatological complaints. She had EBV serologies showing reactivation and was told that she had EBV mononucleosis earlier in the year. At the time she had chills, temp < 10.  She c/o fatigue and occasional night sweats.  She has recovered from right facial/ocular shingles. [de-identified] : Stable last few months no fevers PB showed < 5000 CD5+ B cells incr in partial CD56+, CD57+ NK cells, TCR beta rearr+, TCR gamma rearr  indeterminate less c/o chills, sweats chronic arthralgias, myalgias  2/20/24: Patient is here for follow up with her son. She reports her leukopenia had worsened. ANC had dropped to low of 0.3. Patient follows with Dr. Lisa Antonio, and had repeat BMBx about 1-2 months ago, which was reportedly similar to previous. She has been receiving neupogen injections Monday, Wednesday and Friday. Her ANC had been as low as 0.38 on 2/12/24. Patient has been feeling completely exhausted and takes naps for about 2 hours during the day. She has had poor appetite and eats out of necessity. Her  is in a nursing home. She was hospitalized three times. She was at [facility] due to left sided abdominal pain and was found to have a small bowel obstruction, which resolved spontaneously. She also had diverticulitis. During that time, she had marked leukopenia. She was hospitalized for hemoptysis. She had imaging performed. She did not have bronchoscopy performed. She was also hospitalized for abnormal EKG, but reportedly had a normal cardiac workup.

## 2024-02-23 NOTE — REVIEW OF SYSTEMS
[Patient Intake Form Reviewed] : Patient intake form was reviewed [Fatigue] : fatigue [Insomnia] : insomnia [Anxiety] : anxiety [Depression] : depression [Negative] : Allergic/Immunologic [de-identified] : as above [Fever] : no fever [Chills] : no chills [Night Sweats] : no night sweats [Recent Change In Weight] : ~T no recent weight change [Joint Pain] : no joint pain [Joint Stiffness] : no joint stiffness [Easy Bleeding] : no tendency for easy bleeding [Easy Bruising] : no tendency for easy bruising [Swollen Glands] : no swollen glands [FreeTextEntry2] : loss of appetite [FreeTextEntry7] : irritable bowel [FreeTextEntry9] : past h/o fibromyalgia [de-identified] : "low grade depression"

## 2024-02-26 ENCOUNTER — RESULT REVIEW (OUTPATIENT)
Age: 79
End: 2024-02-26

## 2024-02-26 ENCOUNTER — APPOINTMENT (OUTPATIENT)
Dept: INFUSION THERAPY | Facility: HOSPITAL | Age: 79
End: 2024-02-26

## 2024-02-26 ENCOUNTER — LABORATORY RESULT (OUTPATIENT)
Age: 79
End: 2024-02-26

## 2024-02-26 ENCOUNTER — APPOINTMENT (OUTPATIENT)
Dept: HEMATOLOGY ONCOLOGY | Facility: CLINIC | Age: 79
End: 2024-02-26
Payer: MEDICARE

## 2024-02-26 VITALS
OXYGEN SATURATION: 99 % | DIASTOLIC BLOOD PRESSURE: 84 MMHG | WEIGHT: 112 LBS | SYSTOLIC BLOOD PRESSURE: 147 MMHG | BODY MASS INDEX: 22.28 KG/M2 | RESPIRATION RATE: 16 BRPM | HEART RATE: 84 BPM | TEMPERATURE: 98.6 F | HEIGHT: 59.49 IN

## 2024-02-26 DIAGNOSIS — D72.820 LYMPHOCYTOSIS (SYMPTOMATIC): ICD-10-CM

## 2024-02-26 LAB
BASOPHILS # BLD AUTO: 0 K/UL — SIGNIFICANT CHANGE UP (ref 0–0.2)
BASOPHILS NFR BLD AUTO: 0 % — SIGNIFICANT CHANGE UP (ref 0–2)
EOSINOPHIL # BLD AUTO: 0.04 K/UL — SIGNIFICANT CHANGE UP (ref 0–0.5)
EOSINOPHIL NFR BLD AUTO: 1.3 % — SIGNIFICANT CHANGE UP (ref 0–6)
ERYTHROCYTE [SEDIMENTATION RATE] IN BLOOD: 86 MM/HR — HIGH (ref 0–20)
HCT VFR BLD CALC: 33.2 % — LOW (ref 34.5–45)
HGB BLD-MCNC: 10.4 G/DL — LOW (ref 11.5–15.5)
IMM GRANULOCYTES NFR BLD AUTO: 2 % — HIGH (ref 0–0.9)
LYMPHOCYTES # BLD AUTO: 2.41 K/UL — SIGNIFICANT CHANGE UP (ref 1–3.3)
LYMPHOCYTES # BLD AUTO: 78.5 % — HIGH (ref 13–44)
MCHC RBC-ENTMCNC: 30.1 PG — SIGNIFICANT CHANGE UP (ref 27–34)
MCHC RBC-ENTMCNC: 31.3 G/DL — LOW (ref 32–36)
MCV RBC AUTO: 96 FL — SIGNIFICANT CHANGE UP (ref 80–100)
MONOCYTES # BLD AUTO: 0.24 K/UL — SIGNIFICANT CHANGE UP (ref 0–0.9)
MONOCYTES NFR BLD AUTO: 7.8 % — SIGNIFICANT CHANGE UP (ref 2–14)
NEUTROPHILS # BLD AUTO: 0.32 K/UL — LOW (ref 1.8–7.4)
NEUTROPHILS NFR BLD AUTO: 10.4 % — LOW (ref 43–77)
NRBC # BLD: 0 /100 WBCS — SIGNIFICANT CHANGE UP (ref 0–0)
PLATELET # BLD AUTO: 146 K/UL — LOW (ref 150–400)
RBC # BLD: 3.46 M/UL — LOW (ref 3.8–5.2)
RBC # FLD: 16.8 % — HIGH (ref 10.3–14.5)
WBC # BLD: 3.07 K/UL — LOW (ref 3.8–10.5)
WBC # FLD AUTO: 3.07 K/UL — LOW (ref 3.8–10.5)

## 2024-02-26 PROCEDURE — 38221 DX BONE MARROW BIOPSIES: CPT | Mod: LT

## 2024-02-26 NOTE — REASON FOR VISIT
[Bone Marrow Biopsy] : bone marrow biopsy [Bone Marrow Aspiration] : bone marrow aspiration [Family Member] : family member [FreeTextEntry2] : chronic neutropenia r/o MBL, NK/T Cell LGL

## 2024-02-26 NOTE — PROCEDURE
[Bone Marrow Biopsy] : bone marrow biopsy [Bone Marrow Aspiration] : bone marrow aspiration  [Patient] : the patient [Patient identification verified] : patient identification verified [Verbal Consent Obtained] : verbal consent was obtained prior to the procedure [Procedure verified and consent obtained] : procedure verified and consent obtained [Laterality verified and correct site marked] : laterality verified and correct site marked [Left] : site: left [Correct positioning] : correct positioning [Correct implant and/ or special equipment obtained] : correct impact and/ or special equipment obtained [Prone] : prone [The left posterior iliac crest was prepped with betadine and draped, using sterile technique.] : The left posterior iliac crest was prepped with betadine and draped, using sterile technique. [Superior iliac spine was identified] : the superior iliac spine was identified. [Aspirate] : aspirate [Lidocaine was injected and into the periosteum overlying the site.] : Lidocaine was injected and into the periosteum overlying the site. [FISH] : FISH [Cytogenetics] : cytogenetics [Other ___] : [unfilled] [Biopsy] : biopsy [Flow Cytometry] : flow cytometry [] : The patient was instructed to remove the bandage the following AM. The patient may bathe. Acetaminophen may be taken for discomfort, as per package directions.If there are any other problems, the patient was instructed to call the office. The patient verbalized understanding, and is aware of the office contact numbers. [FreeTextEntry1] : chronic neutropenia r/o MBL, NK/T Cell LGL [FreeTextEntry2] :   8 cc of 1 % lidocaine was used for the procedure.   WBC: 3.07  K/uL Hgb:   10.4 g/dL Hct:   33.2  % Plts:   146  K/uL   Bone marrow aspiration and biopsy were done. [facility] myeloid and lympoid panel requested. [facility] sent TRACKING NUMBER [id number]

## 2024-03-07 ENCOUNTER — NON-APPOINTMENT (OUTPATIENT)
Age: 79
End: 2024-03-07

## 2024-05-16 ENCOUNTER — APPOINTMENT (OUTPATIENT)
Dept: HEMATOLOGY ONCOLOGY | Facility: CLINIC | Age: 79
End: 2024-05-16
Payer: MEDICARE

## 2024-05-16 ENCOUNTER — RESULT REVIEW (OUTPATIENT)
Age: 79
End: 2024-05-16

## 2024-05-16 VITALS
TEMPERATURE: 97.2 F | SYSTOLIC BLOOD PRESSURE: 125 MMHG | DIASTOLIC BLOOD PRESSURE: 86 MMHG | OXYGEN SATURATION: 98 % | RESPIRATION RATE: 16 BRPM | HEART RATE: 111 BPM

## 2024-05-16 DIAGNOSIS — Z51.11 ENCOUNTER FOR ANTINEOPLASTIC CHEMOTHERAPY: ICD-10-CM

## 2024-05-16 DIAGNOSIS — C92.00 ACUTE MYELOBLASTIC LEUKEMIA, NOT HAVING ACHIEVED REMISSION: ICD-10-CM

## 2024-05-16 DIAGNOSIS — D70.9 NEUTROPENIA, UNSPECIFIED: ICD-10-CM

## 2024-05-16 DIAGNOSIS — K52.9 NONINFECTIVE GASTROENTERITIS AND COLITIS, UNSPECIFIED: ICD-10-CM

## 2024-05-16 DIAGNOSIS — G25.2 OTHER SPECIFIED FORMS OF TREMOR: ICD-10-CM

## 2024-05-16 DIAGNOSIS — R11.0 NAUSEA: ICD-10-CM

## 2024-05-16 LAB
ALBUMIN SERPL ELPH-MCNC: 3.2 G/DL
ALP BLD-CCNC: 350 U/L
ALT SERPL-CCNC: 13 U/L
ANION GAP SERPL CALC-SCNC: 10 MMOL/L
AST SERPL-CCNC: 19 U/L
BILIRUB SERPL-MCNC: 0.3 MG/DL
BUN SERPL-MCNC: 11 MG/DL
CALCIUM SERPL-MCNC: 9 MG/DL
CHLORIDE SERPL-SCNC: 101 MMOL/L
CO2 SERPL-SCNC: 23 MMOL/L
CREAT SERPL-MCNC: 0.42 MG/DL
EGFR: 100 ML/MIN/1.73M2
GLUCOSE SERPL-MCNC: 112 MG/DL
LDH SERPL-CCNC: 164 U/L
PHOSPHATE SERPL-MCNC: 3.4 MG/DL
POTASSIUM SERPL-SCNC: 5.2 MMOL/L
PROT SERPL-MCNC: 6.8 G/DL
SODIUM SERPL-SCNC: 134 MMOL/L
URATE SERPL-MCNC: 1.9 MG/DL

## 2024-05-16 PROCEDURE — G2211 COMPLEX E/M VISIT ADD ON: CPT

## 2024-05-16 PROCEDURE — 99215 OFFICE O/P EST HI 40 MIN: CPT

## 2024-05-19 PROBLEM — G25.2 TREMOR, COARSE: Status: ACTIVE | Noted: 2024-05-19

## 2024-05-19 PROBLEM — Z51.11 ENCOUNTER FOR CHEMOTHERAPY MANAGEMENT: Status: ACTIVE | Noted: 2024-05-19

## 2024-05-19 PROBLEM — R11.0 NAUSEA: Status: ACTIVE | Noted: 2024-05-19

## 2024-05-19 PROBLEM — D70.9 NEUTROPENIA, UNSPECIFIED TYPE: Status: ACTIVE | Noted: 2021-10-12

## 2024-05-19 PROBLEM — K52.9 CHRONIC DIARRHEA: Status: ACTIVE | Noted: 2024-05-19

## 2024-05-19 PROBLEM — C92.00 ACUTE MYELOID LEUKEMIA NOT HAVING ACHIEVED REMISSION: Status: ACTIVE | Noted: 2024-05-19

## 2024-05-19 NOTE — PHYSICAL EXAM
[Fully active, able to carry on all pre-disease performance without restriction] : Status 0 - Fully active, able to carry on all pre-disease performance without restriction [Normal] : affect appropriate [de-identified] : no CVAT

## 2024-05-19 NOTE — ASSESSMENT
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   Monoclonal CD5+ pop < 5000 >>> Does not meet criteria for B-CLL; has MBL  She has a complex karyotype, TP53 and IDH1 mutations. She has been taking chris  mg/d for last 6-8 wks or so. CBC results reviewed and d/w pt and sons: WBC 3.55, Hgb 10.8, plt 203K, ANC 1.80 Marked increase in ANC and incr in plts since first seen here 2/20/24  She has persistent GI c/o, heidi. diarrhea, which preceded starting chris. Recommended a GI eval. She has anorexia and weight loss. She also has unexplained tremor, and is scheduled to have a neuro eval. Seeing GI (June 6), neuro consult (May) will hold Venetoclax x1 week to see if symptoms improve Repeat CBC 1 week at [place] consider treatment with dac/chris; issues with transportation >>> consider inqovi and chris as a po alternative. Will discuss further. Will need to come to [facility] frequently for transfusional support regardless. Check CBC at [place] in one week.  seen and d/w Dr. Reji Millard MD PGY8 Heme/Onc Fellow

## 2024-05-19 NOTE — REVIEW OF SYSTEMS
[Patient Intake Form Reviewed] : Patient intake form was reviewed [Fatigue] : fatigue [Insomnia] : insomnia [Anxiety] : anxiety [Depression] : depression [Negative] : Allergic/Immunologic [Fever] : no fever [Chills] : no chills [Night Sweats] : no night sweats [Recent Change In Weight] : ~T no recent weight change [Joint Pain] : no joint pain [Joint Stiffness] : no joint stiffness [Easy Bleeding] : no tendency for easy bleeding [Easy Bruising] : no tendency for easy bruising [Swollen Glands] : no swollen glands [FreeTextEntry7] : irritable bowel syndrome by hx, now c/o more of diarrhea [FreeTextEntry2] : loss of appetite [FreeTextEntry9] : past h/o fibromyalgia [de-identified] : "low grade depression"

## 2024-05-19 NOTE — HISTORY OF PRESENT ILLNESS
[Disease:__________________________] : Disease: [unfilled] [ECOG Performance Status: 1 - Restricted in physically strenuous activity but ambulatory and able to carry out work of a light or sedentary nature] : Performance Status: 1 - Restricted in physically strenuous activity but ambulatory and able to carry out work of a light or sedentary nature, e.g., light house work, office work [de-identified] : Ms. 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  She has a long h/o of having a relative lymphocytosis w/o leukocytosis, and gradually more pronounced neutropenia.  ANC was 1103 11/2018 and 582 2/200, 729 1/2021 and bet. 453-800 bet. 3/21 and 8/21   She has had intermittent monocytopenia. She had EBV serologies in  1/2021 c/w recent infection.  She has (+) Rheumatoid Factor (261). Hgb has been in the 12 range, plt have been nl.  WBC has been in the 3-4 range.  Her last ANC was 0.8 on 9/28/21. BMA/Bx from 3/18/21 has been reviewed here: CD5+ monoclonal B cells - minimal involvement-in marrow with trilineage maturation.  Mild increase in NK like T cells were seen; no significant increase in CD57+ cells was seen on review here; outside report notes increase (11.3%) in CD57/CD3+ T-LGL cells, NK cells were 2.3%.  Cytogenetics was normal.  Clonal TCR gamma gene rearrangement was noted.  She has not had fever, dental, skin or other infections.  She has not had rash or significant rheumatological complaints. She had EBV serologies showing reactivation and was told that she had EBV mononucleosis earlier in the year. At the time she had chills, temp < 10.  She c/o fatigue and occasional night sweats.  She has recovered from right facial/ocular shingles. 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-40% involvement)      - CD5 positive B-cell   lymphoproliferative  disorder, in predominantlysinusoidal pattern (10% involvement)       - Clonal NK like T-cells (3% of cells) by flow cytometry  Karyotype: 45,XX,-7[7]/45,XX,i(11)(q10),-12,-17,+mar[2]/46,XX[11]Nausea, complete food aversion, no appetite - started with the venetoclax ~100mg;   NGS-  Tier I: Variants of Strong Clinical Significance   IDH1 p.[id number]   TP53 p.[id number]   Tier II: Variants of Potential Clinical Significance   DNMT3A p.[id number]   TET2 p.[id number]   NRAS p.[id number]   Tier III: Variants of Unknown Clinical Significance   EZH2 p.[id number] able to eat much; not much improvement when off venetoclax; Has been zofran, which helps a little (6-7/10, effects are short lived). Family interested in appetite stimulant.  The smell of food makes her nauseous. Even when brought food from outside. Has been drinking 1 ensure/day.   started treatmet with venetoclax as monotherapy, has been moatly on therapy taking 100 mg/d, reducd more recently to 50 mg/d. Alternating constipation and diarrhea since starting venetoclax; Abdominal pain - has had since before starting venetoclax PT and OT. Can't tolerate exercises due to diarrhea at times. Does leg exercises in bed. Can get up to walk with assistance/walker. Currently cannot walk at all.  Has tremors all over her body, and also cannot take part in PT. Started several months ago. Was having trouble with handwriting, buttoning garments. Symptoms progressed to shakiness in legs since admitted to [place].  2/20/24: Patient is here for follow up with her son. She reports her leukopenia had worsened. ANC had dropped to low of 0.3. Patient follows with Dr. Lisa Antonio, and had repeat BMBx about 1-2 months ago, which was reportedly similar to previous. She has been receiving neupogen injections Monday, Wednesday and Friday. Her ANC had been as low as 0.38 on 2/12/24. Patient has been feeling completely exhausted and takes naps for about 2 hours during the day. She has had poor appetite and eats out of necessity. Her  is in a nursing home. She was hospitalized three times. She was at [facility] due to left sided abdominal pain and was found to have a small bowel obstruction, which resolved spontaneously. She also had diverticulitis. During that time, she had marked leukopenia. She was hospitalized for hemoptysis. She had imaging performed. She did not have bronchoscopy performed. She was also hospitalized for abnormal EKG, but reportedly had a normal cardiac workup.

## 2024-05-19 NOTE — END OF VISIT
[] : Fellow [FreeTextEntry3] : She remains quite frail. 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 She has had a remarkable improvement in her ANC, plt count on single agent chris. Can at this point stop ppx antibiotics, incl vfend, and follow ANC.

## 2024-05-23 ENCOUNTER — APPOINTMENT (OUTPATIENT)
Dept: NEUROLOGY | Facility: HOSPITAL | Age: 79
End: 2024-05-23

## 2024-06-23 ENCOUNTER — EMERGENCY (EMERGENCY)
Facility: HOSPITAL | Age: 79
LOS: 1 days | Discharge: HOME | End: 2024-06-23
Attending: EMERGENCY MEDICINE | Admitting: EMERGENCY MEDICINE
Payer: MEDICARE

## 2024-06-23 VITALS
HEIGHT: 58 IN | HEART RATE: 62 BPM | SYSTOLIC BLOOD PRESSURE: 90 MMHG | DIASTOLIC BLOOD PRESSURE: 60 MMHG | TEMPERATURE: 98 F | RESPIRATION RATE: 16 BRPM

## 2024-06-23 VITALS
DIASTOLIC BLOOD PRESSURE: 70 MMHG | OXYGEN SATURATION: 100 % | SYSTOLIC BLOOD PRESSURE: 106 MMHG | HEART RATE: 72 BPM | TEMPERATURE: 99 F | RESPIRATION RATE: 16 BRPM

## 2024-06-23 PROCEDURE — 93010 ELECTROCARDIOGRAM REPORT: CPT

## 2024-06-23 PROCEDURE — 70450 CT HEAD/BRAIN W/O DYE: CPT | Mod: 26,MC

## 2024-06-23 PROCEDURE — 99284 EMERGENCY DEPT VISIT MOD MDM: CPT | Mod: GC

## 2024-06-23 PROCEDURE — 72125 CT NECK SPINE W/O DYE: CPT | Mod: 26,MC

## 2024-06-23 RX ORDER — ACETAMINOPHEN 500 MG
975 TABLET ORAL ONCE
Refills: 0 | Status: COMPLETED | OUTPATIENT
Start: 2024-06-23 | End: 2024-06-23

## 2024-06-23 RX ADMIN — Medication 975 MILLIGRAM(S): at 03:15

## 2024-06-23 NOTE — ED PROVIDER NOTE - PHYSICAL EXAMINATION
Silvana Anderson DO (PGY1)   Physical Exam:    Gen: NAD, AOx3  Head: L forehead 3 x 3cm hematoma with no step offs   HEENT: EOMI, PEERLA, normal conjunctiva, tongue midline, oral mucosa moist  Lung: CTAB, no respiratory distress, no wheezes/rhonchi/rales B/L  CV: RRR, no murmurs, rubs or gallops  Abd: soft, NT, ND, no guarding, no rigidity, no rebound tenderness, no CVA tenderness   Neuro: No focal sensory or motor deficits

## 2024-06-23 NOTE — ED PROVIDER NOTE - ATTENDING CONTRIBUTION TO CARE
79 year old who was reported to have roleed out of bed. obvious head trauma. on xarelto. head ct and neck ct to rule out ich or subdural or c spine fx. reassess

## 2024-06-23 NOTE — ED ADULT TRIAGE NOTE - PATIENT ON (OXYGEN DELIVERY METHOD)
-- DO NOT REPLY / DO NOT REPLY ALL --  -- Message is from Engagement Center Operations ([facility]) --    General Patient Message: Patient would like a call back she needs an itemized receipt for date of service  10/27. A brief description  is needed for co pay for [employer] card.     Caller Information       Type Contact Phone/Fax    01/17/2023 09:53 AM CST Phone (Incoming) KranthiYina (Self) [phone] (M)        Alternative phone number: n/a    Can a detailed message be left? Yes    Message Turnaround:     Is it Working Hours? Yes - Working Hours     [place]:    Please give this turnaround time to the caller:   \"This message will be sent to [state Provider's name]. The clinical team will fulfill your request as soon as they review your message.\"                 room air

## 2024-06-23 NOTE — ED PROVIDER NOTE - OBJECTIVE STATEMENT
78 yo F on XARELTO presenting for eval after mechanical fall while getting out of bed. Patient endorsing HA and neck pain and has hematoma an the forehead. Denies nausea, vomitingMcGrath PGY1

## 2024-06-23 NOTE — ED PROVIDER NOTE - CLINICAL SUMMARY MEDICAL DECISION MAKING FREE TEXT BOX
80 yo with fall on xeralto and Fore head hematoma and neck pain. CT head and neck non con to assess for ICH vs fx. Beulah YIP. Monica PGY1

## 2024-06-23 NOTE — ED ADULT TRIAGE NOTE - CHIEF COMPLAINT QUOTE
Patient fell an hour and a half ago at [facility]. Non witnessed fall. Staff found patient.  Patient rolled out of bed. Neck brace applied by EMT. Large hematoma noted on middle of forehead with redness. A & O x 4. c/o left sided neck pain. EKG at triage. Patient takes xarelto. Patient fell an hour and a half ago at [facility]. Non witnessed fall. Staff found patient.  Patient rolled out of bed. Neck brace applied by EMT. Large hematoma noted on middle of forehead with redness. A & O x 4. c/o left sided neck pain. EKG at triage. Patient takes xarelto. FS 87. Patient fell an hour and a half ago at [facility]. Non witnessed fall. Staff found patient.  Patient rolled out of bed. Neck brace applied by EMT. Large hematoma noted on middle of forehead with redness. A & O x 4. c/o left sided neck pain. EKG at triage. Patient takes xarelto. FS 87. Could not get weight in triage.

## 2024-06-23 NOTE — ED ADULT NURSE NOTE - CHIEF COMPLAINT QUOTE
Patient fell an hour and a half ago at [facility]. Non witnessed fall. Staff found patient.  Patient rolled out of bed. Neck brace applied by EMT. Large hematoma noted on middle of forehead with redness. A & O x 4. c/o left sided neck pain. EKG at triage. Patient takes xarelto. FS 87. Could not get weight in triage.

## 2024-06-23 NOTE — ED PROVIDER NOTE - PATIENT PORTAL LINK FT
You can access the FollowMyHealth Patient Portal offered by [facility] by registering at the following website: [email or web address]. By joining [employer]’s FollowMyHealth portal, you will also be able to view your health information using other applications (apps) compatible with our system.

## 2024-06-23 NOTE — ED ADULT NURSE NOTE - OBJECTIVE STATEMENT
Pt received to room 18, A&O x 4, ambulatory with walker, coming from [facility] s/p fall. Pt reported to have unwitnessed fall, found on ground by staff, pt reports having "crazy dream trying to get out of doctors office" and rolled off of bed, arrives with large hematoma to middle forehead, small scratch noted on right side face, take Xarelto, no bleeding noted, denies LOC, complains of neck stiffness, cleared from [facility] by MD. Pt medicated as per EMAR, RR equal and unlabored, safety maintained, comfort provided, awaiting imaging at this time.

## 2024-06-23 NOTE — ED PROVIDER NOTE - CARE PLAN
1 Principal Discharge DX:	Traumatic hematoma of forehead  Secondary Diagnosis:	Headache due to injury of head and neck

## 2024-06-23 NOTE — ED ADULT NURSE NOTE - NSFALLRISKINTERV_ED_ALL_ED

## 2024-06-23 NOTE — ED ADULT NURSE REASSESSMENT NOTE - NS ED NURSE REASSESS COMMENT FT1
Pt resting in stretcher, at baseline orientation status, offers no complaints of pain or discomfort, pressure sore dressings noted to medial back and sacrum, turned and repositioned, RR equal and unlabored on RA, DC at this time, awaiting transportation to rehab center

## 2024-06-24 ENCOUNTER — APPOINTMENT (OUTPATIENT)
Dept: HEMATOLOGY ONCOLOGY | Facility: CLINIC | Age: 79
End: 2024-06-24

## 2024-07-02 ENCOUNTER — RESULT REVIEW (OUTPATIENT)
Age: 79
End: 2024-07-02

## 2024-07-02 ENCOUNTER — APPOINTMENT (OUTPATIENT)
Dept: HEMATOLOGY ONCOLOGY | Facility: CLINIC | Age: 79
End: 2024-07-02

## 2024-07-02 ENCOUNTER — APPOINTMENT (OUTPATIENT)
Dept: HEMATOLOGY ONCOLOGY | Facility: CLINIC | Age: 79
End: 2024-07-02
Payer: MEDICARE

## 2024-07-02 VITALS
HEIGHT: 58 IN | DIASTOLIC BLOOD PRESSURE: 74 MMHG | TEMPERATURE: 98.8 F | RESPIRATION RATE: 16 BRPM | HEART RATE: 85 BPM | WEIGHT: 95 LBS | SYSTOLIC BLOOD PRESSURE: 105 MMHG | BODY MASS INDEX: 19.94 KG/M2 | OXYGEN SATURATION: 99 %

## 2024-07-02 DIAGNOSIS — K52.9 NONINFECTIVE GASTROENTERITIS AND COLITIS, UNSPECIFIED: ICD-10-CM

## 2024-07-02 DIAGNOSIS — Z78.9 OTHER SPECIFIED HEALTH STATUS: ICD-10-CM

## 2024-07-02 DIAGNOSIS — K21.9 GASTRO-ESOPHAGEAL REFLUX DISEASE W/OUT ESOPHAGITIS: ICD-10-CM

## 2024-07-02 DIAGNOSIS — C92.00 ACUTE MYELOBLASTIC LEUKEMIA, NOT HAVING ACHIEVED REMISSION: ICD-10-CM

## 2024-07-02 DIAGNOSIS — Z51.11 ENCOUNTER FOR ANTINEOPLASTIC CHEMOTHERAPY: ICD-10-CM

## 2024-07-02 DIAGNOSIS — F32.A DEPRESSION, UNSPECIFIED: ICD-10-CM

## 2024-07-02 PROCEDURE — 99214 OFFICE O/P EST MOD 30 MIN: CPT

## 2024-07-18 PROBLEM — I10 ESSENTIAL (PRIMARY) HYPERTENSION: Chronic | Status: ACTIVE | Noted: 2024-01-01

## 2024-08-05 ENCOUNTER — APPOINTMENT (OUTPATIENT)
Dept: HEMATOLOGY ONCOLOGY | Facility: CLINIC | Age: 79
End: 2024-08-05

## 2024-10-07 ENCOUNTER — APPOINTMENT (OUTPATIENT)
Dept: HEMATOLOGY ONCOLOGY | Facility: CLINIC | Age: 79
End: 2024-10-07

## 2024-10-29 ENCOUNTER — APPOINTMENT (OUTPATIENT)
Dept: NEUROLOGY | Facility: CLINIC | Age: 79
End: 2024-10-29

## 2025-02-11 NOTE — ED ADULT NURSE NOTE - NS ED PATIENT SAFETY CONCERN
In an effort to ensure that our patients [employer], a Team Member has reviewed your chart and identified an opportunity to provide the best care possible. An attempt was made to discuss or schedule due or overdue Preventive or Chronic Condition care.Care Gaps identified: Immunizations and Wellness Visits.    The Outcome was Contact was not made, letter/portal message sent.  We are attempting to schedule a yearly wellness visit. If you have any questions or need help with scheduling, contact your primary care provider..   Type of Appointment needed: Medicare Wellness Visit    
No